# Patient Record
Sex: FEMALE | Race: WHITE | Employment: FULL TIME | URBAN - METROPOLITAN AREA
[De-identification: names, ages, dates, MRNs, and addresses within clinical notes are randomized per-mention and may not be internally consistent; named-entity substitution may affect disease eponyms.]

---

## 2019-02-18 ENCOUNTER — OFFICE VISIT (OUTPATIENT)
Dept: INTERNAL MEDICINE CLINIC | Facility: CLINIC | Age: 34
End: 2019-02-18

## 2019-02-18 VITALS
TEMPERATURE: 98.3 F | DIASTOLIC BLOOD PRESSURE: 78 MMHG | WEIGHT: 168 LBS | HEIGHT: 63 IN | SYSTOLIC BLOOD PRESSURE: 119 MMHG | OXYGEN SATURATION: 100 % | RESPIRATION RATE: 16 BRPM | BODY MASS INDEX: 29.77 KG/M2 | HEART RATE: 70 BPM

## 2019-02-18 DIAGNOSIS — R10.13 EPIGASTRIC ABDOMINAL PAIN: Primary | ICD-10-CM

## 2019-02-18 DIAGNOSIS — K21.9 GASTROESOPHAGEAL REFLUX DISEASE WITHOUT ESOPHAGITIS: ICD-10-CM

## 2019-02-18 RX ORDER — PHENOL/SODIUM PHENOLATE
20 AEROSOL, SPRAY (ML) MUCOUS MEMBRANE DAILY
Qty: 30 TAB | Refills: 0 | Status: SHIPPED | OUTPATIENT
Start: 2019-02-18 | End: 2019-03-17 | Stop reason: SDUPTHER

## 2019-02-18 NOTE — PROGRESS NOTES
Subjective:  
  
Sammie العلي is a 28 y.o. female who is a new patient and is here to establish care and discuss GI concerns. The following sections were reviewed & updated as appropriate: PMH, PL, PSH, FH, RxH, and SH. Works Abdominal Pain Sammie العلي is here to talk about abdominal pain. This is a new problem and symptoms began 6 weeks ago with food poisoning. She had this 2 times and was evaluated at . She had vomiting and managed this symptomatically. Since this she has noted feeling belching, symptoms of epigastric burning that is in the morning and the worse is after dinner. She is taking Pepcid AC and states this helps. She notes that eating salads helps, she is trying to avoid sweets, coffee, and sodas. She believes this is helping. She had dinner last night and symptoms were worse. She denies blood in stools or urine, vomiting recently. She does note some nausea. She denies diarrhea or constipation. Pain is periumbilical in location and described as burning. .  She symptoms are aggravated by coffee, recumbency and acidic foods. Past history includes: negative, no previous chronic GI history. Previous work up includes: evaluation at  during acute gastritis. There are no active problems to display for this patient. Allergies Allergen Reactions  Vicodin [Hydrocodone-Acetaminophen] Nausea Only History reviewed. No pertinent past medical history. Past Surgical History:  
Procedure Laterality Date  REPAIR [address] FISTULA Left 2015  
 repair of fistula on left side of rectum Review of Systems A comprehensive review of systems was negative except for that written in the HPI. Objective:  
 
Visit Vitals /78 (BP 1 Location: Left arm, BP Patient Position: Sitting) Pulse 70 Temp 98.3 °F (36.8 °C) (Oral) Resp 16 Ht 5' 3\" (1.6 m) Wt 168 lb (76.2 kg) LMP 02/15/2019 (Exact Date) SpO2 100% BMI 29.76 kg/m² General appearance: alert, cooperative, no distress, appears stated age Head: Normocephalic, without obvious abnormality, atraumatic Eyes: negative Back: Negative for CVA tenderness Lungs: clear to auscultation bilaterally Heart: regular rate and rhythm, S1, S2 normal, no murmur, click, rub or gallop Abdomen: soft, non-tender. Bowel sounds normal. No masses,  no organomegaly. Symptoms noted when she was laying down Extremities: extremities normal, atraumatic, no cyanosis or edema Neurologic: Alert and oriented X 3, normal strength and tone. Normal symmetric reflexes. Normal coordination and gait Psych: appropriate mood, speech, affect Nursing note and vitals reviewed Assessment/Plan: ICD-10-CM ICD-9-CM 1. Epigastric abdominal pain Z13.59 237.84 METABOLIC PANEL, COMPREHENSIVE 2. Gastroesophageal reflux disease without esophagitis L81.9 329.20 METABOLIC PANEL, COMPREHENSIVE  
GERD vs ulcer. She will start prilosec for 2 weeks and then message about symptoms. If still problematic will continue PPI for additional 2 weeks. Will taper at completion Follow-up Disposition: 
Return for follow up in 2 weeks via [facility] to let me know how you are doing. Advised her to call back or return to office if symptoms worsen/change/persist. 
Discussed expected course/resolution/complications of diagnosis in detail with patient. Medication risks/benefits/costs/interactions/alternatives discussed with patient. She was given an after visit summary which includes diagnoses, current medications, & vitals. She expressed understanding with the diagnosis and plan.

## 2019-02-18 NOTE — PATIENT INSTRUCTIONS
Gastroesophageal Reflux Disease (GERD): Care Instructions Your Care Instructions Gastroesophageal reflux disease (GERD) is the backward flow of stomach acid into the esophagus. The esophagus is the tube that leads from your throat to your stomach. A one-way valve prevents the stomach acid from moving up into this tube. When you have GERD, this valve does not close tightly enough. If you have mild GERD symptoms including heartburn, you may be able to control the problem with antacids or over-the-counter medicine. Changing your diet, losing weight, and making other lifestyle changes can also help reduce symptoms. Follow-up care is a key part of your treatment and safety. Be sure to make and go to all appointments, and call your doctor if you are having problems. It's also a good idea to know your test results and keep a list of the medicines you take. How can you care for yourself at home? · Take your medicines exactly as prescribed. Call your doctor if you think you are having a problem with your medicine. · Your doctor may recommend over-the-counter medicine. For mild or occasional indigestion, antacids, such as Tums, Gaviscon, Mylanta, or Maalox, may help. Your doctor also may recommend over-the-counter acid reducers, such as Pepcid AC, Tagamet HB, Zantac 75, or Prilosec. Read and follow all instructions on the label. If you use these medicines often, talk with your doctor. · Change your eating habits. ? It's best to eat several small meals instead of two or three large meals. ? After you eat, wait 2 to 3 hours before you lie down. ? Chocolate, mint, and alcohol can make GERD worse. ? Spicy foods, foods that have a lot of acid (like tomatoes and oranges), and coffee can make GERD symptoms worse in some people. If your symptoms are worse after you eat a certain food, you may want to stop eating that food to see if your symptoms get better. · Do not smoke or chew tobacco. Smoking can make GERD worse. If you need help quitting, talk to your doctor about stop-smoking programs and medicines. These can increase your chances of quitting for good. · If you have GERD symptoms at night, raise the head of your bed 6 to 8 inches by putting the frame on blocks or placing a foam wedge under the head of your mattress. (Adding extra pillows does not work.) · Do not wear tight clothing around your middle. · Lose weight if you need to. Losing just 5 to 10 pounds can help. When should you call for help? Call your doctor now or seek immediate medical care if: 
  · You have new or different belly pain.  
  · Your stools are black and tarlike or have streaks of blood.  
 Watch closely for changes in your health, and be sure to contact your doctor if: 
  · Your symptoms have not improved after 2 days.  
  · Food seems to catch in your throat or chest.  
Where can you learn more? Go to http://damir-stef.info/. Enter S068 in the search box to learn more about \"Gastroesophageal Reflux Disease (GERD): Care Instructions. \" Current as of: March 27, 2018 Content Version: 11.9 © 7452-5623 [employer]. Care instructions adapted under license by [email or web address] (which disclaims liability or warranty for this information). If you have questions about a medical condition or this instruction, always ask your healthcare professional. [place] [address] any warranty or liability for your use of this information.

## 2019-02-18 NOTE — PROGRESS NOTES
Identified pt with two pt identifiers(name and ). Reviewed record in preparation for visit and have obtained necessary documentation. Chief Complaint Patient presents with [facility] Establish Care  Abdominal Pain  
  epigastric pain for 2 weeks Health Maintenance Due Topic  DTaP/Tdap/Td series (1 - Tdap)  PAP AKA CERVICAL CYTOLOGY  Influenza Age 5 to Adult Coordination of Care Questionnaire: 
:  
1) Have you been to an emergency room, urgent care, or hospitalized since your last visit? yes If yes, where when, and reason for visit? [facility]  for food poisioning 2. Have seen or consulted any other health care provider since your last visit? NO If yes, where when, and reason for visit? 3) Do you have an Advanced Directive/ Living Will in place? NO If yes, do we have a copy on file NO If no, would you like information NO Patient is accompanied by self I have received verbal consent from Eduardo Leal to discuss any/all medical information while they are present in the room. Visit Vitals /78 (BP 1 Location: Left arm, BP Patient Position: Sitting) Pulse 70 Temp 98.3 °F (36.8 °C) (Oral) Resp 16 Ht 5' 3\" (1.6 m) Wt 168 lb (76.2 kg) SpO2 100% BMI 29.76 kg/m² Cole Mccarthy LPN

## 2019-02-24 LAB
ALBUMIN SERPL-MCNC: 4.7 G/DL (ref 3.5–5.5)
ALBUMIN/GLOB SERPL: 1.7 {RATIO} (ref 1.2–2.2)
ALP SERPL-CCNC: 71 IU/L (ref 39–117)
ALT SERPL-CCNC: 33 IU/L (ref 0–32)
AST SERPL-CCNC: 25 IU/L (ref 0–40)
BILIRUB SERPL-MCNC: 0.6 MG/DL (ref 0–1.2)
BUN SERPL-MCNC: 6 MG/DL (ref 6–20)
BUN/CREAT SERPL: 8 (ref 9–23)
CALCIUM SERPL-MCNC: 9.6 MG/DL (ref 8.7–10.2)
CHLORIDE SERPL-SCNC: 102 MMOL/L (ref 96–106)
CO2 SERPL-SCNC: 24 MMOL/L (ref 20–29)
CREAT SERPL-MCNC: 0.77 MG/DL (ref 0.57–1)
GLOBULIN SER CALC-MCNC: 2.7 G/DL (ref 1.5–4.5)
GLUCOSE SERPL-MCNC: 88 MG/DL (ref 65–99)
POTASSIUM SERPL-SCNC: 4.2 MMOL/L (ref 3.5–5.2)
PROT SERPL-MCNC: 7.4 G/DL (ref 6–8.5)
SODIUM SERPL-SCNC: 142 MMOL/L (ref 134–144)

## 2019-02-25 ENCOUNTER — TELEPHONE (OUTPATIENT)
Dept: INTERNAL MEDICINE CLINIC | Facility: CLINIC | Age: 34
End: 2019-02-25

## 2019-02-25 NOTE — TELEPHONE ENCOUNTER
----- Message from Makr Jenkins NP sent at 2/25/2019  8:18 AM EST -----  Please call this patient and follow up on her abdominal pain. She was to start antacids and see if this helped. Her blood work showed a very minor elevation in one of her liver values. If the prilosec (omeprazole) is not helping I will place referral to GI for additional evaluation.  If she is feeling better then ask her to reach out to me in a week or so to check in  ----- Message -----  From: Nirmal Cox Lab Results In  Sent: 2/24/2019   6:39 AM  To: Mark Jenkins NP

## 2019-02-25 NOTE — TELEPHONE ENCOUNTER
Patient was called and detailed message left on voicemail to have patient call office for test results and let Danii Franks NP know how her abdominal pain is now after antacids have been started.   Tahir Espitia LPN

## 2019-03-07 ENCOUNTER — TELEPHONE (OUTPATIENT)
Dept: INTERNAL MEDICINE CLINIC | Facility: CLINIC | Age: 34
End: 2019-03-07

## 2019-03-07 NOTE — TELEPHONE ENCOUNTER
Patient was called on work number and a message was left to let us know how she is doing. I called the work number after a call to the home/mobile number, the person who answered said it was the wrong number.   Katrina Cordero LPN

## 2019-03-07 NOTE — TELEPHONE ENCOUNTER
----- Message from Denver Polanco, REGINA sent at 2/25/2019  8:18 AM EST -----  Please call this patient and follow up on her abdominal pain. She was to start antacids and see if this helped. Her blood work showed a very minor elevation in one of her liver values. If the prilosec (omeprazole) is not helping I will place referral to GI for additional evaluation.  If she is feeling better then ask her to reach out to me in a week or so to check in  ----- Message -----  From: Nirmal Cox Lab Results In  Sent: 2/24/2019   6:39 AM  To: Denver Polanco NP

## 2019-03-08 ENCOUNTER — TELEPHONE (OUTPATIENT)
Dept: INTERNAL MEDICINE CLINIC | Facility: CLINIC | Age: 34
End: 2019-03-08

## 2019-03-08 DIAGNOSIS — K21.9 GASTROESOPHAGEAL REFLUX DISEASE WITHOUT ESOPHAGITIS: Primary | ICD-10-CM

## 2019-03-08 DIAGNOSIS — R79.89 ELEVATED LIVER FUNCTION TESTS: ICD-10-CM

## 2019-03-08 NOTE — TELEPHONE ENCOUNTER
Spoke with caleb jalloh relayed the message from [facility].   Patient states she got the voicemail

## 2019-03-08 NOTE — TELEPHONE ENCOUNTER
Patient called and given message from [facility] NP. Patient states she got the message but did not listen to it. She was advised a referral for Deon Ballesteros will be mailed to her.   Caroline Wyatt LPN

## 2019-03-18 RX ORDER — OMEPRAZOLE 20 MG/1
CAPSULE, DELAYED RELEASE ORAL
Qty: 30 CAP | Refills: 0 | Status: SHIPPED | OUTPATIENT
Start: 2019-03-18